# Patient Record
Sex: FEMALE | Race: WHITE | NOT HISPANIC OR LATINO | Employment: PART TIME | URBAN - METROPOLITAN AREA
[De-identification: names, ages, dates, MRNs, and addresses within clinical notes are randomized per-mention and may not be internally consistent; named-entity substitution may affect disease eponyms.]

---

## 2018-02-20 ENCOUNTER — MEDICAL CORRESPONDENCE (OUTPATIENT)
Dept: HEALTH INFORMATION MANAGEMENT | Facility: CLINIC | Age: 28
End: 2018-02-20

## 2018-02-21 ENCOUNTER — TELEPHONE (OUTPATIENT)
Dept: GASTROENTEROLOGY | Facility: CLINIC | Age: 28
End: 2018-02-21

## 2018-02-22 ENCOUNTER — TELEPHONE (OUTPATIENT)
Dept: GASTROENTEROLOGY | Facility: CLINIC | Age: 28
End: 2018-02-22

## 2018-02-23 ENCOUNTER — TELEPHONE (OUTPATIENT)
Dept: GASTROENTEROLOGY | Facility: CLINIC | Age: 28
End: 2018-02-23

## 2022-07-27 ENCOUNTER — HOSPITAL ENCOUNTER (EMERGENCY)
Facility: CLINIC | Age: 32
Discharge: HOME | End: 2022-07-27
Attending: PHYSICIAN ASSISTANT | Admitting: PHYSICIAN ASSISTANT
Payer: COMMERCIAL

## 2022-07-27 VITALS
RESPIRATION RATE: 14 BRPM | SYSTOLIC BLOOD PRESSURE: 116 MMHG | OXYGEN SATURATION: 98 % | TEMPERATURE: 98.3 F | HEART RATE: 78 BPM | DIASTOLIC BLOOD PRESSURE: 65 MMHG

## 2022-07-27 DIAGNOSIS — F41.0 ANXIETY ATTACK: ICD-10-CM

## 2022-07-27 LAB
AMPHETAMINES UR QL SCN: NORMAL
BARBITURATES UR QL: NORMAL
BENZODIAZ UR QL: NORMAL
CANNABINOIDS UR QL SCN: NORMAL
COCAINE UR QL: NORMAL
HCG UR QL: NEGATIVE
OPIATES UR QL SCN: NORMAL
PCP UR QL SCN: NORMAL
SARS-COV-2 RNA RESP QL NAA+PROBE: NEGATIVE

## 2022-07-27 PROCEDURE — 81025 URINE PREGNANCY TEST: CPT | Performed by: PHYSICIAN ASSISTANT

## 2022-07-27 PROCEDURE — 99284 EMERGENCY DEPT VISIT MOD MDM: CPT | Mod: 25

## 2022-07-27 PROCEDURE — C9803 HOPD COVID-19 SPEC COLLECT: HCPCS

## 2022-07-27 PROCEDURE — 90791 PSYCH DIAGNOSTIC EVALUATION: CPT

## 2022-07-27 PROCEDURE — 80307 DRUG TEST PRSMV CHEM ANLYZR: CPT | Performed by: PHYSICIAN ASSISTANT

## 2022-07-27 PROCEDURE — U0003 INFECTIOUS AGENT DETECTION BY NUCLEIC ACID (DNA OR RNA); SEVERE ACUTE RESPIRATORY SYNDROME CORONAVIRUS 2 (SARS-COV-2) (CORONAVIRUS DISEASE [COVID-19]), AMPLIFIED PROBE TECHNIQUE, MAKING USE OF HIGH THROUGHPUT TECHNOLOGIES AS DESCRIBED BY CMS-2020-01-R: HCPCS | Performed by: PHYSICIAN ASSISTANT

## 2022-07-27 RX ORDER — HYDROXYZINE HYDROCHLORIDE 25 MG/1
25 TABLET, FILM COATED ORAL 3 TIMES DAILY PRN
Qty: 9 TABLET | Refills: 0 | Status: SHIPPED | OUTPATIENT
Start: 2022-07-27

## 2022-07-27 RX ORDER — HYDROXYZINE HYDROCHLORIDE 25 MG/1
25 TABLET, FILM COATED ORAL 3 TIMES DAILY PRN
Qty: 9 TABLET | Refills: 0 | Status: SHIPPED | OUTPATIENT
Start: 2022-07-27 | End: 2022-07-27

## 2022-07-27 ASSESSMENT — ENCOUNTER SYMPTOMS
HALLUCINATIONS: 0
NERVOUS/ANXIOUS: 1
PALPITATIONS: 1
SHORTNESS OF BREATH: 1
NUMBNESS: 1

## 2022-07-27 NOTE — DISCHARGE INSTRUCTIONS
You have been referred to Video Therapy Session with:   Date: Thursday, 7/28/2022  Time: 1:00 pm - 2:00 pm  Provider: Shun Duke  Location: [place] [address]  Phone: [phone]  Type: Teletherapy    Scheduling Instructions  Shun Duke, OG, VYSW, is supervised by Carol Mcgraw MA, [facility]. For teletherapy appts: Include patient's email AND phone number for contact info. For clients under 18: <12: The first session is with just the parent(s)/guardian(s) 12-15: Prefer the first session to be with just parent(s)/guardian(s) 16-18: Best if both the parent(s)/guardian(s)and kid/teen are present. **Parents must be present to review paperwork with clinician*  Patient Instructions  For all appointments: you will be sent information to fill out the intake paperwork online. Please fill the paperwork prior to your appointment. For telehealth appointments: you will also be sent a zoom link to attend the appointment remotely.    2. You are given a small prescription of medication if you choose to use it for anxiety.  Although you mentioned you want to start therapy first, we also discussed using this if another panic attack occurs to help with managing symptoms.    3. You mentioned you will be staying with you friend Dann bull and for tomorrow for support.  Please follow through with this.    4. Return to the Emergency Department if things become worse.     ____________________________________________________________________________________________________________________________________________________________________________________      Aftercare Plan  If I am feeling unsafe or I am in a crisis, I will:   Contact my established care providers   Call the National Suicide Prevention Lifeline: 988  Go to the nearest emergency room   Call 911     Warning signs that I or other people might notice when a crisis is developing for me: 1. When I feel anxious  2. When I have  "problems with sleep    Things I am able to do on my own to cope or help me feel better:   1. Using essential oils  2. Utilizing my salt rock  3. Using Accupressure on my hands     Things that I am able to do with others to cope or help me better:   1. Talk to my friend Dann  2. Talk to my family     Things I can use or do for distraction:   1. Using essential oils  2. Utilizing my salt rock   3. Using Accupressure on my hands     Changes I can make to support my mental health and wellness:   1. Start therapy tomorrow     People in my life that I can ask for help:   1. My friend Dann Mosher  2. My parents      Your [facility] has a mental health crisis team you can call 24/7: [facility] Mobile Crisis  [phone] (adults)  [phone] (children)    Other things that are important when I'm in crisis:      Additional resources and information:         Crisis Lines  Crisis Text Line  Text 507724  You will be connected with a trained live crisis counselor to provide support.    Por espanol, texto  SALINA a 526023 o texto a 442-AYUDAME en WhatsApp    The Rashad Project (LGBTQ Youth Crisis Line)  1.934.525.6439  text START to 248-721      Community Resources  Fast Tracker  Linking people to mental health and substance use disorder resources  fasttrackkabukun.org     [place] Mental Health Warm Line  Peer to peer support  Monday thru Saturday, 12 pm to 10 pm  640.463.7793 or 3.019.784.5107  Text \"Support\" to 93805    National [place] on Mental Illness (MANA)  135.536.8135 or 1.888.MANA.HELPS      Mental Health Apps  My3  https://my3app.org/    VirtualHopeBox  https://CrowdEngineering.org/apps/virtual-hope-box/      Additional Information  Today you were seen by a licensed mental health professional through Triage and Transition services, [facility] ([facility])  for a crisis assessment in the Emergency Department at [facility].  It is recommended that you follow up with your established " providers (psychiatrist, mental health therapist, and/or primary care doctor - as relevant) as soon as possible. Coordinators from [facility] will be calling you in the next 24-48 hours to ensure that you have the resources you need.  You can also contact [facility] coordinators directly at [phone]. You may have been scheduled for or offered an appointment with a mental health provider. [facility] maintains an extensive network of licensed behavioral health providers to connect patients with the services they need.  We do not charge providers a fee to participate in our referral network.  We match patients with providers based on a patient's specific needs, insurance coverage, and location.  Our first effort will be to refer you to a provider within your care system, and will utilize providers outside your care system as needed.

## 2022-07-27 NOTE — ED NOTES
Bed: ED18  Expected date:   Expected time:   Means of arrival:   Comments:  [place] - 515 - 31 F panic attack eta 1411
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
## 2022-09-27 ENCOUNTER — HOSPITAL ENCOUNTER (EMERGENCY)
Facility: CLINIC | Age: 32
Discharge: LEFT BEFORE DISCHARGE | End: 2022-09-27
Admitting: EMERGENCY MEDICINE
Payer: COMMERCIAL

## 2022-09-27 VITALS
OXYGEN SATURATION: 99 % | WEIGHT: 167 LBS | BODY MASS INDEX: 28.51 KG/M2 | SYSTOLIC BLOOD PRESSURE: 146 MMHG | RESPIRATION RATE: 20 BRPM | HEART RATE: 98 BPM | DIASTOLIC BLOOD PRESSURE: 87 MMHG | TEMPERATURE: 99.2 F | HEIGHT: 64 IN

## 2022-09-27 LAB
ATRIAL RATE - MUSE: 97 BPM
DIASTOLIC BLOOD PRESSURE - MUSE: NORMAL MMHG
INTERPRETATION ECG - MUSE: NORMAL
P AXIS - MUSE: 72 DEGREES
PR INTERVAL - MUSE: 152 MS
QRS DURATION - MUSE: 78 MS
QT - MUSE: 338 MS
QTC - MUSE: 429 MS
R AXIS - MUSE: 43 DEGREES
SYSTOLIC BLOOD PRESSURE - MUSE: NORMAL MMHG
T AXIS - MUSE: 25 DEGREES
VENTRICULAR RATE- MUSE: 97 BPM

## 2022-09-27 PROCEDURE — 999N000104 HC STATISTIC NO CHARGE

## 2022-09-27 PROCEDURE — 93005 ELECTROCARDIOGRAM TRACING: CPT | Performed by: PHYSICIAN ASSISTANT

## 2022-09-27 RX ORDER — KETOROLAC TROMETHAMINE 30 MG/ML
30 INJECTION, SOLUTION INTRAMUSCULAR; INTRAVENOUS ONCE
Status: DISCONTINUED | OUTPATIENT
Start: 2022-09-27 | End: 2022-09-27 | Stop reason: HOSPADM

## 2022-09-27 RX ORDER — SODIUM CHLORIDE 9 MG/ML
INJECTION, SOLUTION INTRAVENOUS CONTINUOUS
Status: DISCONTINUED | OUTPATIENT
Start: 2022-09-27 | End: 2022-09-27 | Stop reason: HOSPADM

## 2022-09-27 NOTE — ED TRIAGE NOTES
Patient reports severe dehydration per clinic. She has been having difficulty swallowing much fluids due to throat pain.  chest pain with activity. Lightheadedness.      Triage Assessment     Row Name 09/27/22 1215       Triage Assessment (Adult)    Airway WDL WDL       Respiratory WDL    Respiratory WDL WDL       Skin Circulation/Temperature WDL    Skin Circulation/Temperature WDL WDL       Cardiac WDL    Cardiac WDL X  chest pain with activity       Peripheral/Neurovascular WDL    Peripheral Neurovascular WDL WDL       Cognitive/Neuro/Behavioral WDL    Cognitive/Neuro/Behavioral WDL X  lightheaded    Level of Consciousness alert    Speech fluent;clear

## 2022-09-27 NOTE — ED NOTES
"Attempted Iv insertion x1 unsuccessfully, patient did not wish for writer to try again. Patient states \"I'm just tired of getting poked\".   "

## 2023-01-28 ENCOUNTER — HEALTH MAINTENANCE LETTER (OUTPATIENT)
Age: 33
End: 2023-01-28

## 2024-02-25 ENCOUNTER — HEALTH MAINTENANCE LETTER (OUTPATIENT)
Age: 34
End: 2024-02-25

## 2025-03-09 ENCOUNTER — HEALTH MAINTENANCE LETTER (OUTPATIENT)
Age: 35
End: 2025-03-09